# Patient Record
Sex: FEMALE | URBAN - METROPOLITAN AREA
[De-identification: names, ages, dates, MRNs, and addresses within clinical notes are randomized per-mention and may not be internally consistent; named-entity substitution may affect disease eponyms.]

---

## 2023-12-12 ENCOUNTER — OFFICE VISIT (OUTPATIENT)
Dept: URGENT CARE | Facility: CLINIC | Age: 58
End: 2023-12-12

## 2023-12-12 VITALS
OXYGEN SATURATION: 97 % | TEMPERATURE: 97.9 F | DIASTOLIC BLOOD PRESSURE: 86 MMHG | SYSTOLIC BLOOD PRESSURE: 122 MMHG | RESPIRATION RATE: 16 BRPM | HEART RATE: 95 BPM | WEIGHT: 195 LBS

## 2023-12-12 DIAGNOSIS — R10.32 LEFT LOWER QUADRANT ABDOMINAL PAIN: Primary | ICD-10-CM

## 2023-12-12 DIAGNOSIS — N39.0 URINARY TRACT INFECTION WITHOUT HEMATURIA, SITE UNSPECIFIED: ICD-10-CM

## 2023-12-12 LAB
POC APPEARANCE, URINE: CLEAR
POC BILIRUBIN, URINE: NEGATIVE
POC BLOOD, URINE: NEGATIVE
POC COLOR, URINE: YELLOW
POC GLUCOSE, URINE: NEGATIVE MG/DL
POC KETONES, URINE: NEGATIVE MG/DL
POC LEUKOCYTES, URINE: NEGATIVE
POC NITRITE,URINE: NEGATIVE
POC PH, URINE: 5.5 PH
POC PROTEIN, URINE: NEGATIVE MG/DL
POC SPECIFIC GRAVITY, URINE: 1.01
POC UROBILINOGEN, URINE: 0.2 EU/DL

## 2023-12-12 PROCEDURE — 87086 URINE CULTURE/COLONY COUNT: CPT

## 2023-12-12 PROCEDURE — 99204 OFFICE O/P NEW MOD 45 MIN: CPT | Performed by: PHYSICIAN ASSISTANT

## 2023-12-12 PROCEDURE — 81002 URINALYSIS NONAUTO W/O SCOPE: CPT | Performed by: PHYSICIAN ASSISTANT

## 2023-12-12 ASSESSMENT — ENCOUNTER SYMPTOMS
RESPIRATORY NEGATIVE: 1
NEUROLOGICAL NEGATIVE: 1
CONSTITUTIONAL NEGATIVE: 1
PSYCHIATRIC NEGATIVE: 1
DYSURIA: 0
VOMITING: 0
EYES NEGATIVE: 1
NAUSEA: 0
ENDOCRINE NEGATIVE: 1
CONSTIPATION: 0
ALLERGIC/IMMUNOLOGIC NEGATIVE: 1
DIARRHEA: 0
BACK PAIN: 0
ABDOMINAL PAIN: 1
CARDIOVASCULAR NEGATIVE: 1
HEMATOLOGIC/LYMPHATIC NEGATIVE: 1

## 2023-12-12 ASSESSMENT — PAIN SCALES - GENERAL: PAINLEVEL: 4

## 2023-12-12 NOTE — PROGRESS NOTES
Subjective   Patient ID: Darcie York is a 58 y.o. female.      History provided by:  Patient   used: No    Fever   Associated symptoms include abdominal pain. Pertinent negatives include no diarrhea, nausea, urinary pain or vomiting.   This is a 58 yr old female here for LLQ abdominal pain x 6 days. No diarrhea, constipation, n/v, dysuria, back pain, or abnormal vaginal discharge.    Review of Systems   Constitutional: Negative.    HENT: Negative.     Eyes: Negative.    Respiratory: Negative.     Cardiovascular: Negative.    Gastrointestinal:  Positive for abdominal pain. Negative for constipation, diarrhea, nausea and vomiting.   Endocrine: Negative.    Genitourinary:  Negative for dysuria and vaginal discharge.   Musculoskeletal:  Negative for back pain.   Skin: Negative.    Allergic/Immunologic: Negative.    Neurological: Negative.    Hematological: Negative.    Psychiatric/Behavioral: Negative.       Past Medical History:   Diagnosis Date    Personal history of infections of the central nervous system     History of viral meningitis    Personal history of other diseases of the circulatory system     History of varicose veins    Segmental and somatic dysfunction of lumbar region     Somatic dysfunction of lumbar region     No current outpatient medications on file prior to visit.     No current facility-administered medications on file prior to visit.     Allergies   Allergen Reactions    Penicillins Rash     /86   Pulse 95   Temp 36.6 °C (97.9 °F)   Resp 16   Wt 88.5 kg (195 lb)   SpO2 97%   Objective   Physical Exam  Vitals and nursing note reviewed.   Constitutional:       Appearance: Normal appearance.   HENT:      Head: Normocephalic and atraumatic.   Cardiovascular:      Rate and Rhythm: Normal rate and regular rhythm.   Pulmonary:      Effort: Pulmonary effort is normal.      Breath sounds: Normal breath sounds.   Abdominal:      Palpations: Abdomen is soft.      Tenderness:  There is abdominal tenderness (LLQ pain with palpation). There is no guarding or rebound.   Skin:     General: Skin is warm and dry.   Neurological:      General: No focal deficit present.      Mental Status: She is alert and oriented to person, place, and time.   Psychiatric:         Mood and Affect: Mood normal.         Behavior: Behavior normal.     UA dip-all negative    Assessment:  LLQ abdominal pain    Plan:  No evidence of UTI on dipstick  Urine cx sent and pending  No further testing available for abdominal pain in UC setting. No labs or advanced imaging or us.   See pcp or go to the ER for worsening or changing condition for more workup and care

## 2023-12-13 LAB — BACTERIA UR CULT: NORMAL

## 2024-10-17 ENCOUNTER — APPOINTMENT (OUTPATIENT)
Dept: NEUROLOGY | Facility: CLINIC | Age: 59
End: 2024-10-17
Payer: COMMERCIAL

## 2024-10-17 VITALS
HEIGHT: 64 IN | WEIGHT: 198.4 LBS | TEMPERATURE: 97.5 F | SYSTOLIC BLOOD PRESSURE: 126 MMHG | HEART RATE: 95 BPM | RESPIRATION RATE: 16 BRPM | DIASTOLIC BLOOD PRESSURE: 78 MMHG | BODY MASS INDEX: 33.87 KG/M2

## 2024-10-17 DIAGNOSIS — R06.83 SNORING: ICD-10-CM

## 2024-10-17 DIAGNOSIS — G47.33 OSA (OBSTRUCTIVE SLEEP APNEA): ICD-10-CM

## 2024-10-17 DIAGNOSIS — G47.33 OSA (OBSTRUCTIVE SLEEP APNEA): Primary | ICD-10-CM

## 2024-10-17 PROCEDURE — 99205 OFFICE O/P NEW HI 60 MIN: CPT | Performed by: PSYCHIATRY & NEUROLOGY

## 2024-10-17 PROCEDURE — 3008F BODY MASS INDEX DOCD: CPT | Performed by: PSYCHIATRY & NEUROLOGY

## 2024-10-17 PROCEDURE — 1036F TOBACCO NON-USER: CPT | Performed by: PSYCHIATRY & NEUROLOGY

## 2024-10-17 RX ORDER — ALBUTEROL SULFATE 90 UG/1
2 INHALANT RESPIRATORY (INHALATION) EVERY 4 HOURS PRN
COMMUNITY
Start: 2024-06-25

## 2024-10-17 RX ORDER — SERTRALINE HYDROCHLORIDE 50 MG/1
50 TABLET, FILM COATED ORAL DAILY
COMMUNITY
Start: 2024-10-17

## 2024-10-17 RX ORDER — OMEPRAZOLE 20 MG/1
20 CAPSULE, DELAYED RELEASE ORAL
COMMUNITY

## 2024-10-17 ASSESSMENT — ENCOUNTER SYMPTOMS
OCCASIONAL FEELINGS OF UNSTEADINESS: 0
LOSS OF SENSATION IN FEET: 0
DEPRESSION: 0

## 2024-10-17 NOTE — PATIENT INSTRUCTIONS
I would like to start the patient on AutoPap with pressures from 4 to 20 cm of water.  I will check a compliance report after she has been on AutoPap for 1 month.  I would like the patient to stop in the sleep sleep lab after the visit today and try a nasal mask to see if she likes it.  I will also try to enroll the patient in a research study for weight loss.  The patient does need to lose weight to her ideal body weight, avoid supine position, improve her sleep hygiene get a least 8 hours sleep at night.  The patient should not drive while drowsy.  I discussed all these issues in detail with the patient and answered all of her questions.  The patient will follow-up with me in 6 months.

## 2024-10-17 NOTE — PROGRESS NOTES
Subjective     Darcie York 58 y.o.  HPI  The patient states she has had snoring for at least 3 years.  She states that her snoring is so bad she is no longer sleeping with her .  The patient states that her  has told her that she gasps for air at night.  The patient does remember waking up gasping for air at times.  The patient typically has lights out at 11:30 PM and it takes her just minutes to fall asleep.  The patient will wake for the day at 7:15 AM.  She does not feel that her sleep is refreshing when she wakes up in the morning.  The patient states that she does not nap during the day and does not fall asleep with reading, driving or watching TV.  Her [place] Sleepiness Scale score today was 3.  The patient does feel somewhat fatigued and tired throughout the day.  The patient states that she prefers to sleep on her side but does sleep on both her back and side.  The patient did pay for her own home sleep study and she had a WatchPAT that showed her AHI was 13.9 with a REM AHI of 37.6.  Her low oxygen saturation was 70%.  The patient did sleep for 7 hours and 42 minutes and 26.4% was REM sleep.    The patient states that she quit smoking 2 years ago and she has a 40-pack-year history of smoking.  The patient states that she is gained about 30 pounds since she quit smoking.    Review of Systems   All other systems reviewed and are negative.       There is no problem list on file for this patient.       Past Medical History:   Diagnosis Date    Personal history of infections of the central nervous system     History of viral meningitis    Personal history of other diseases of the circulatory system     History of varicose veins    Segmental and somatic dysfunction of lumbar region     Somatic dysfunction of lumbar region    Sleep apnea         Past Surgical History:   Procedure Laterality Date    TUBAL LIGATION  02/19/2013    Tubal Ligation        Social History     Socioeconomic History     "Marital status:      Spouse name: Not on file    Number of children: Not on file    Years of education: Not on file    Highest education level: Not on file   Occupational History    Not on file   Tobacco Use    Smoking status: Former     Types: Cigarettes     Passive exposure: Past    Smokeless tobacco: Never   Vaping Use    Vaping status: Never Used   Substance and Sexual Activity    Alcohol use: Yes     Comment: occasionally    Drug use: Never    Sexual activity: Not on file   Other Topics Concern    Not on file   Social History Narrative    Not on file     Social Drivers of Health     Financial Resource Strain: Not on file   Food Insecurity: Not on file   Transportation Needs: Not on file   Physical Activity: Not on file   Stress: Not on file   Social Connections: Not on file   Intimate Partner Violence: Not on file   Housing Stability: Not on file        Family History   Problem Relation Name Age of Onset    Memory loss Mother      Dementia Maternal Grandfather      Stroke Paternal Grandmother          Current Outpatient Medications   Medication Instructions    albuterol 90 mcg/actuation inhaler 2 puffs, Every 4 hours PRN    omeprazole (PRILOSEC) 20 mg, Daily before breakfast    sertraline (ZOLOFT) 50 mg, Daily        Allergies   Allergen Reactions    Penicillins Rash        Objective  /78 (BP Location: Right arm)   Pulse 95   Temp 36.4 °C (97.5 °F)   Resp 16   Ht 1.613 m (5' 3.5\")   Wt 90 kg (198 lb 6.4 oz)   BMI 34.59 kg/m²    GENERAL APPEARANCE:  No distress, alert and cooperative.      The patient has a Mallampati class III airway.    CARDIOVASCULAR: Regular, rate and rhythm, without murmur. No carotid bruits. Pulses +2 and equal in all extremities. No edema, or tenderness to palpation.     MENTAL STATE:  Orientation was normal to time, place and person. Recent and remote memory was intact.  Attention span and concentration were normal. Language testing was normal for comprehension, " repetition, expression, and naming. Calculation was intact. The patient could correctly interpret a picture, and copy a diagram. General fund of knowledge was intact. Mini-mental status examination was performed with no errors.      OPHTHALMOSCOPIC: The ophthalmoscopic exam normal. The fundi were well visualized with normal disc margins, clear vessels and vascular pulsations. No disc edema. The cup/disk ratio was not enlarged. No hemorrhages or exudates were present in the posterior segments that were visualized.      CRANIAL NERVES:  Cranial nerves were normal.      CN 2- Visual Acuity  OD: 20/20 (corrected)   OS: 20/20 (corrected); visual fields full to confrontation.      CN 3, 4, 6-  Pupils round, 4 mm in diameter, equally reactive to light. No ptosis. EOMs normal alignment, full range of movement, no nystagmus     CN 5- Facial sensation intact bilaterally. Normal corneal reflexes.      CN 7- Normal and symmetric facial strength. Nasolabial folds symmetric.     CN 8- Hearing intact to finger rub, whisper.      CN 9- Palate elevates symmetrically. Normal gag reflex.      CN 11- Normal strength of shoulder shrug and neck turning      CN 12- Tongue midline, with normal bulk and strength; no fasciculations.      MOTOR:  Motor exam was normal. Muscle bulk and tone were normal in both upper and lower extremities. Muscle strength was 5/5 in distal and proximal muscles in both upper and lower extremities. No fasciculations, tremor or other abnormal movements were present.      REFLEXES:  Right/ Left:  Biceps 2/2, brachioradialis 2/2, triceps 2/2, patellar 2/2, ankle 2/2  Babinski: toes downgoing to plantar stimulation. No clonus, frontal release signs or other pathologic reflexes present.      SENSORY: Sensory exam was intact to light touch, sharp/dull, vibration and position sense in both UE and LE.      COORDINATION: NIKITA were intact in upper and lower extremities.  In UE- finger-nose-finger was intact and in LE-  heel-to-shin was intact without dysmetria or overshoot.       GAIT: Station was stable with a normal base and negative Romberg sign. Gait was stable with a normal arm swing and speed. No ataxia, shuffling, steppage or waddling was noted. Tandem gait was intact. Postural reflexes were normal.     Assessment/Plan   Impression: The patient is a 58-year-old female with a history of snoring who ultimately had a home sleep study that shows mild sleep apnea with worsening and REM sleep and severe oxygen desaturations.  Her neurological examination is normal.    Plan: I would like to start the patient on AutoPap with pressures from 4 to 20 cm of water.  I will check a compliance report after she has been on AutoPap for 1 month.  I would like the patient to stop in the sleep sleep lab after the visit today and try a nasal mask to see if she likes it.  I will also try to enroll the patient in a research study for weight loss.  The patient does need to lose weight to her ideal body weight, avoid supine position, improve her sleep hygiene get a least 8 hours sleep at night.  The patient should not drive while drowsy.  I discussed all these issues in detail with the patient and answered all of her questions.  The patient will follow-up with me in 6 months.

## 2025-05-06 ENCOUNTER — APPOINTMENT (OUTPATIENT)
Dept: NEUROLOGY | Facility: CLINIC | Age: 60
End: 2025-05-06
Payer: COMMERCIAL

## 2025-06-02 ENCOUNTER — APPOINTMENT (OUTPATIENT)
Dept: NEUROLOGY | Facility: CLINIC | Age: 60
End: 2025-06-02
Payer: COMMERCIAL

## 2025-06-02 VITALS
HEART RATE: 82 BPM | DIASTOLIC BLOOD PRESSURE: 80 MMHG | RESPIRATION RATE: 14 BRPM | WEIGHT: 193.2 LBS | TEMPERATURE: 97.5 F | HEIGHT: 64 IN | SYSTOLIC BLOOD PRESSURE: 124 MMHG | BODY MASS INDEX: 32.98 KG/M2

## 2025-06-02 DIAGNOSIS — R06.83 SNORING: ICD-10-CM

## 2025-06-02 DIAGNOSIS — G47.33 OSA (OBSTRUCTIVE SLEEP APNEA): Primary | ICD-10-CM

## 2025-06-02 PROCEDURE — G8433 SCR FOR DEP NOT CPT DOC RSN: HCPCS | Performed by: PSYCHIATRY & NEUROLOGY

## 2025-06-02 PROCEDURE — 1036F TOBACCO NON-USER: CPT | Performed by: PSYCHIATRY & NEUROLOGY

## 2025-06-02 PROCEDURE — 99214 OFFICE O/P EST MOD 30 MIN: CPT | Performed by: PSYCHIATRY & NEUROLOGY

## 2025-06-02 PROCEDURE — 3008F BODY MASS INDEX DOCD: CPT | Performed by: PSYCHIATRY & NEUROLOGY

## 2025-06-02 ASSESSMENT — ENCOUNTER SYMPTOMS
DEPRESSION: 0
LOSS OF SENSATION IN FEET: 0
OCCASIONAL FEELINGS OF UNSTEADINESS: 0

## 2025-06-02 NOTE — PATIENT INSTRUCTIONS
The patient is doing very well from a sleep medicine standpoint.  The patient should continue to use her CPAP every night and all night.  The patient will go home and call us back with her machine set serial number and we will check her compliance report.  The patient does need to lose weight to her ideal body weight, avoid supine position, improve her sleep hygiene get a least 8 hours sleep at night.  The patient should not drive while drowsy.  I discussed all these issues in detail with the patient and answered all of her questions.  The patient will follow-up with the NP in 1 year.

## 2025-06-02 NOTE — PROGRESS NOTES
Subjective     Darcie York 59 y.o.  HPI  The patient states that she is doing well from a sleep medicine standpoint.  The patient has lights out between 10 PM and 11 PM and it takes her about 30 minutes to fall asleep.  The patient will wake for the day between 715 and 7:30 AM.  She is using her CPAP every night and all night and she feels that her sleep is refreshing.  Her [place] Sleepiness Scale score today was 1.  The patient is changing her mask and cleaning her machine with soap and water on a regular basis.  The patient states that if she accidentally pulls her mask off she knows that she is still snoring.  She feels that she is doing better during the day as she is not waking up with a headache.  She feels that she is doing better from a cognitive standpoint.  The patient is using both a fullface and nasal mask.  The patient is using a chinstrap when she uses a nasal mask.  The patient does use a humidifier with a fullface mask but at times there is too much water in her mask.    Review of Systems   All other systems reviewed and are negative.       Problem List[1]     Medical History[2]     Surgical History[3]     Social History     Socioeconomic History    Marital status:      Spouse name: Not on file    Number of children: Not on file    Years of education: Not on file    Highest education level: Not on file   Occupational History    Not on file   Tobacco Use    Smoking status: Former     Types: Cigarettes     Passive exposure: Past    Smokeless tobacco: Never   Vaping Use    Vaping status: Never Used   Substance and Sexual Activity    Alcohol use: Yes     Comment: occasionally    Drug use: Never    Sexual activity: Not on file   Other Topics Concern    Not on file   Social History Narrative    Not on file     Social Drivers of Health     Financial Resource Strain: Not on file   Food Insecurity: Not on file   Transportation Needs: Not on file   Physical Activity: Not on file   Stress: Not on file  "  Social Connections: Not on file   Intimate Partner Violence: Not on file   Housing Stability: Not on file        Family History[4]     Current Outpatient Medications   Medication Instructions    albuterol 90 mcg/actuation inhaler 2 puffs, Every 4 hours PRN    omeprazole (PRILOSEC) 20 mg, Daily before breakfast    sertraline (ZOLOFT) 50 mg, Daily        RX Allergies[5]     Objective  /80 (BP Location: Left arm)   Pulse 82   Temp 36.4 °C (97.5 °F)   Resp 14   Ht 1.613 m (5' 3.5\")   Wt 87.6 kg (193 lb 3.2 oz)   BMI 33.69 kg/m²    GENERAL APPEARANCE:  No distress, alert and cooperative.     MENTAL STATE:  Orientation was normal to time, place and person. Recent and remote memory was intact.  Attention span and concentration were normal. Language testing was normal for comprehension, repetition, expression, and naming. Calculation was intact. The patient could correctly interpret a picture, and copy a diagram. General fund of knowledge was intact.     CRANIAL NERVES:  Cranial nerves were normal.      CN 2- Visual Acuity  OD: 20/20 (corrected)   OS: 20/20 (corrected); visual fields full to confrontation.      CN 3, 4, 6-  Pupils round, 4 mm in diameter, equally reactive to light. No ptosis. EOMs normal alignment, full range of movement, no nystagmus     CN 5- Facial sensation intact bilaterally. Normal corneal reflexes.      CN 7- Normal and symmetric facial strength. Nasolabial folds symmetric.     CN 8- Hearing intact to finger rub, whisper.      CN 9- Palate elevates symmetrically. Normal gag reflex.      CN 11- Normal strength of shoulder shrug and neck turning      CN 12- Tongue midline, with normal bulk and strength; no fasciculations.     MOTOR:  Motor exam was normal. Muscle bulk and tone were normal in both upper and lower extremities. Muscle strength was 5/5 in distal and proximal muscles in both upper and lower extremities. No fasciculations, tremor or other abnormal movements were present. "     GAIT: Station was stable with a normal base and negative Romberg sign. Gait was stable with a normal arm swing and speed. No ataxia, shuffling, steppage or waddling was noted. Tandem gait was intact. Postural reflexes were normal.     Assessment/Plan   The patient is doing very well from a sleep medicine standpoint.  The patient should continue to use her CPAP every night and all night.  The patient will go home and call us back with her machine set serial number and we will check her compliance report.  The patient does need to lose weight to her ideal body weight, avoid supine position, improve her sleep hygiene get a least 8 hours sleep at night.  The patient should not drive while drowsy.  I discussed all these issues in detail with the patient and answered all of her questions.  The patient will follow-up with the NP in 1 year.       [1] There is no problem list on file for this patient.  [2]   Past Medical History:  Diagnosis Date    Personal history of infections of the central nervous system     History of viral meningitis    Personal history of other diseases of the circulatory system     History of varicose veins    Segmental and somatic dysfunction of lumbar region     Somatic dysfunction of lumbar region    Sleep apnea    [3]   Past Surgical History:  Procedure Laterality Date    TUBAL LIGATION  02/19/2013    Tubal Ligation   [4]   Family History  Problem Relation Name Age of Onset    Memory loss Mother      Dementia Maternal Grandfather      Stroke Paternal Grandmother     [5]   Allergies  Allergen Reactions    Penicillins Rash